# Patient Record
(demographics unavailable — no encounter records)

---

## 2024-10-31 NOTE — DISCUSSION/SUMMARY
[FreeTextEntry1] : EKG:NSR reviewed recent labs LDL elevated- will get CAC and determine need for statin continue toprol for pSVT;sotalol + eliquis for paf

## 2024-10-31 NOTE — PHYSICAL EXAM
[Well Developed] : well developed [Well Nourished] : well nourished [No Acute Distress] : no acute distress [Normal Conjunctiva] : normal conjunctiva [Normal Venous Pressure] : normal venous pressure [No Carotid Bruit] : no carotid bruit [Normal S1, S2] : normal S1, S2 [No Murmur] : no murmur [No Gallop] : no gallop [Clear Lung Fields] : clear lung fields [Good Air Entry] : good air entry [No Respiratory Distress] : no respiratory distress  [Soft] : abdomen soft [Non Tender] : non-tender [Normal Bowel Sounds] : normal bowel sounds [Normal Gait] : normal gait [No Edema] : no edema [No Cyanosis] : no cyanosis [No Clubbing] : no clubbing [No Rash] : no rash [Moves all extremities] : moves all extremities [Normal Speech] : normal speech [Alert and Oriented] : alert and oriented

## 2025-03-06 NOTE — DISCUSSION/SUMMARY
[FreeTextEntry1] : EKG:SB,LVH reduce BB for lightheadedness and she'll let me know if she feels better continue eliquis+ sotalol for PAF;toprol for PSVT;Crestor for HLD will get TTE for lightheadedness

## 2025-06-27 NOTE — ASSESSMENT
[FreeTextEntry1] : 71F w cerumen -  removed. The rest of the head and neck exam is unremarkable. Cerumen removed -> RTO 6 months for ear exam and cleaning.

## 2025-06-27 NOTE — PHYSICAL EXAM
[Midline] : trachea located in midline position [Normal] : no rashes [FreeTextEntry1] : Au: EAC occluded w thick cerumen removed w suction/curet. TM intact and mobile, ME clear

## 2025-06-27 NOTE — HISTORY OF PRESENT ILLNESS
[de-identified] : 71F here in followup.  She thinks her ears have wax buildup as the hearing is muffled. Last seen 10 months ago in setting of severe cerumen impaction. There is no otorrhea, otalgia, tinnitus or vertigo.  ROS otherwise unremarkable.